# Patient Record
Sex: FEMALE | Race: BLACK OR AFRICAN AMERICAN | Employment: UNEMPLOYED
[De-identification: names, ages, dates, MRNs, and addresses within clinical notes are randomized per-mention and may not be internally consistent; named-entity substitution may affect disease eponyms.]

---

## 2023-05-24 ENCOUNTER — APPOINTMENT (OUTPATIENT)
Facility: HOSPITAL | Age: 53
End: 2023-05-24
Payer: COMMERCIAL

## 2023-05-24 ENCOUNTER — HOSPITAL ENCOUNTER (EMERGENCY)
Facility: HOSPITAL | Age: 53
Discharge: HOME | End: 2023-05-24
Attending: EMERGENCY MEDICINE
Payer: COMMERCIAL

## 2023-05-24 VITALS
WEIGHT: 179 LBS | TEMPERATURE: 97.9 F | HEIGHT: 62 IN | BODY MASS INDEX: 32.94 KG/M2 | HEART RATE: 75 BPM | DIASTOLIC BLOOD PRESSURE: 51 MMHG | SYSTOLIC BLOOD PRESSURE: 117 MMHG | OXYGEN SATURATION: 100 % | RESPIRATION RATE: 12 BRPM

## 2023-05-24 DIAGNOSIS — S92.514A CLOSED NONDISPLACED FRACTURE OF PROXIMAL PHALANX OF LESSER TOE OF RIGHT FOOT, INITIAL ENCOUNTER: Primary | ICD-10-CM

## 2023-05-24 PROCEDURE — 73630 X-RAY EXAM OF FOOT: CPT

## 2023-05-24 PROCEDURE — 99283 EMERGENCY DEPT VISIT LOW MDM: CPT

## 2023-05-24 NOTE — ED PROVIDER NOTES
frame.  This caused immediate pain and swelling to her little toe and she thought she heard a \"crack\". Since that time she has had pain with ambulation as well as rest.  She did take ibuprofen yesterday with some relief. No other concern for injury. No break to the skin or bleeding. I have reviewed all PMHX, FMHX and Social Hx as entered into the medical record in the chart below using the Epic Template. Review of Systems:  Constitutional: neg for fever, chills  ENT:  neg for URI symptoms  Respiratory:  neg for cough, shortness of breath  Cardiovascular:  neg for chest pain  GI:  neg for abdominal pain. :  No Flank pain. MSK: positive for trauma. Integumentary: no rashes, or skin trauma  Neurological: neg for headaches  All other systems reviewed negative with exception of positives in ROS and HPI. Past Medical History:  No past medical history on file. Past Surgical History:  No past surgical history on file. Family History:  No family history on file. Social History: Allergies: Allergies   Allergen Reactions    Pcn [Penicillins] Swelling       Vital Signs:  Vitals:    05/24/23 1530   BP: (!) 117/51   Pulse: 75   Resp: 12   Temp: 97.9 °F (36.6 °C)   TempSrc: Temporal   SpO2: 100%   Weight: 179 lb (81.2 kg)   Height: 5' 2\" (1.575 m)     Physical Exam:  Vital Signs Reviewed. Nursing Notes Reviewed. Constitutional:  Well developed, well nourished patient. Appearance and behavior are age and situation appropriate. Head: Normocephalic, Atraumatic  Eyes: Conjunctiva clear, lids normal. Sclera anicteric. ENT:hearing grossly intact  Extremities: Right foot with obvious swelling to her right little toe. Skin intact. Sensation normal.  Patient does have movement but with pain. No obvious bony defect appreciated with exam.  No other areas of pain. Neuro:  A&O x 3. CN II-XII grossly intact. No gross neuro deficits. Skin:  Warm, dry, no rash. Skin intact.      Diagnostics:    Labs -   No

## 2023-05-24 NOTE — DISCHARGE INSTRUCTIONS
Your x-ray shows a fracture of your right little toe. Everything is in good alignment.   Symptomatic care, ice, 20 minutes 3-4 times a day, elevate, supportive shoe, activity as tolerated  Banal, 500 mg, 2 tablets 3 times a day  Motrin, 200 mg, 3 tablets every 6-8 hours  Follow-up with primary care or podiatry as needed